# Patient Record
Sex: FEMALE | Race: BLACK OR AFRICAN AMERICAN | NOT HISPANIC OR LATINO | Employment: OTHER | URBAN - METROPOLITAN AREA
[De-identification: names, ages, dates, MRNs, and addresses within clinical notes are randomized per-mention and may not be internally consistent; named-entity substitution may affect disease eponyms.]

---

## 2020-08-11 ENCOUNTER — TELEPHONE (OUTPATIENT)
Dept: SPORTS MEDICINE | Facility: CLINIC | Age: 70
End: 2020-08-11

## 2020-08-11 NOTE — TELEPHONE ENCOUNTER
Left message with Patient's son. He said he would have her call me back today to schedule an appt.

## 2020-08-11 NOTE — TELEPHONE ENCOUNTER
----- Message from Bonnie Mcgrath LPN sent at 8/11/2020  3:06 PM CDT -----  Regarding: FW: Complex care of medial meniscus L knee referral. GGG  Contact: Patti (Dr. Gume Parson) @ [phone]  Spoke with Patti Judd/Dr. Parson/[facility] and she refaxed referral to me at [facility]. Am faxing the referral to your [phone] printer now. ]  Thank you,Bonnie  [phone]  ----- Message -----  From: Hanny Garner  Sent: 8/11/2020   2:33 PM CDT  To: [facility] Triage  Subject: appointment                                      Caller is returning a missed call from [facility] regarding scheduling patient an appt as soon as possible for dx: complex care of medial meniscus of the left knee. Caller says the referral was faxed over. Please call.

## 2020-08-17 ENCOUNTER — HOSPITAL ENCOUNTER (OUTPATIENT)
Dept: RADIOLOGY | Facility: HOSPITAL | Age: 70
Discharge: HOME | End: 2020-08-17
Attending: ORTHOPAEDIC SURGERY
Payer: MEDICARE

## 2020-08-17 ENCOUNTER — OFFICE VISIT (OUTPATIENT)
Dept: SPORTS MEDICINE | Facility: CLINIC | Age: 70
End: 2020-08-17
Payer: MEDICARE

## 2020-08-17 VITALS
DIASTOLIC BLOOD PRESSURE: 76 MMHG | SYSTOLIC BLOOD PRESSURE: 149 MMHG | BODY MASS INDEX: 28.32 KG/M2 | HEART RATE: 98 BPM | WEIGHT: 150 LBS | HEIGHT: 61 IN

## 2020-08-17 DIAGNOSIS — M25.562 LEFT KNEE PAIN, UNSPECIFIED CHRONICITY: ICD-10-CM

## 2020-08-17 DIAGNOSIS — M25.562 ACUTE PAIN OF LEFT KNEE: Primary | ICD-10-CM

## 2020-08-17 DIAGNOSIS — M17.12 LOCALIZED PRIMARY OSTEOARTHRITIS OF LEFT LOWER LEG: ICD-10-CM

## 2020-08-17 PROCEDURE — 99203 PR OFFICE/OUTPT VISIT, NEW, LEVL III, 30-44 MIN: ICD-10-PCS | Mod: S$PBB,,, | Performed by: ORTHOPAEDIC SURGERY

## 2020-08-17 PROCEDURE — 99999 PR PBB SHADOW E&M-EST. PATIENT-LVL IV: CPT | Mod: PBBFAC,,, | Performed by: ORTHOPAEDIC SURGERY

## 2020-08-17 PROCEDURE — 99999 PR PBB SHADOW E&M-EST. PATIENT-LVL IV: ICD-10-PCS | Mod: PBBFAC,,, | Performed by: ORTHOPAEDIC SURGERY

## 2020-08-17 PROCEDURE — 99214 OFFICE O/P EST MOD 30 MIN: CPT | Mod: PBBFAC | Performed by: ORTHOPAEDIC SURGERY

## 2020-08-17 PROCEDURE — 99203 OFFICE O/P NEW LOW 30 MIN: CPT | Mod: S$PBB,,, | Performed by: ORTHOPAEDIC SURGERY

## 2020-08-17 RX ORDER — PANTOPRAZOLE SODIUM 40 MG/1
40 TABLET, DELAYED RELEASE ORAL DAILY
COMMUNITY
Start: 2020-07-06 | End: 2023-03-16

## 2020-08-17 RX ORDER — CELECOXIB 200 MG/1
200 CAPSULE ORAL DAILY
COMMUNITY
Start: 2020-06-28

## 2020-08-17 RX ORDER — AMLODIPINE BESYLATE 10 MG/1
10 TABLET ORAL DAILY
COMMUNITY
Start: 2020-07-09

## 2020-08-17 RX ORDER — ROSUVASTATIN CALCIUM 20 MG/1
20 TABLET, COATED ORAL DAILY
COMMUNITY
Start: 2020-07-22

## 2020-08-17 NOTE — LETTER
August 17, 2020      Gume Parson MD  [address] [facility] [address]           [address] [address]  Phone: [phone]          Patient: Josey Gutiérrez   MR Number: [id number]   YOB: 1950   Date of Visit: 8/17/2020       Dear Dr. Gume Parson:    Thank you for referring Josey Gutiérrez to me for evaluation. Attached you will find relevant portions of my assessment and plan of care.    If you have questions, please do not hesitate to call me. I look forward to following Josey Gutiérrez along with you.    Sincerely,    Laureen Santana MD    Enclosure  CC:  No Recipients    If you would like to receive this communication electronically, please contact [email or web address] or [phone] to request more information on [employer] access.    For providers and/or their staff who would like to refer a patient to [facility], please contact us through our one-stop-shop provider referral line, [facility] , at [phone].    If you feel you have received this communication in error or would no longer like to receive these types of communications, please e-mail [email or web address]

## 2020-08-17 NOTE — PROGRESS NOTES
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       70 y.o. Female with a history of Left knee pain.     She reports a fall about 4 weeks ago onto the knee.   The pain has not changed since the injury.     No swelling was noticed.     + mechanical symptoms, she notes clicking.   no instability reported.     Pain with sleeping and sitting for prolonged periods of time.     She takes celebrex for OA but it has not helped with the new injury.   Had CSI to bilateral knee (Dr. Parson) about three weeks ago.   She notes 50% relief.     Is affecting ADLs.      SANE - 80%     Review of Systems   Constitution: Negative. Negative for chills, fever and night sweats.   HENT: Negative for congestion and headaches.    Eyes: Negative for blurred vision, left vision loss and right vision loss.   Cardiovascular: Negative for chest pain and syncope.   Respiratory: Negative for cough and shortness of breath.    Endocrine: Negative for polydipsia, polyphagia and polyuria.   Hematologic/Lymphatic: Negative for bleeding problem. Does not bruise/bleed easily.   Skin: Negative for dry skin, itching and rash.   Musculoskeletal: Negative for falls. Positive for knee pain and muscle weakness.   Gastrointestinal: Negative for abdominal pain and bowel incontinence.   Genitourinary: Negative for bladder incontinence and nocturia.   Neurological: Negative for disturbances in coordination, loss of balance and seizures.   Psychiatric/Behavioral: Negative for depression. The patient does not have insomnia.    Allergic/Immunologic: Negative for hives and persistent infections.     PAST MEDICAL HISTORY:   Past Medical History:   Diagnosis Date    Hypertension      PAST SURGICAL HISTORY: History reviewed. No pertinent surgical history.  FAMILY HISTORY: History reviewed. No pertinent family history.  SOCIAL HISTORY:   Social History     Socioeconomic History    Marital status:      Spouse name: Not on file    Number of children:  "Not on file    Years of education: Not on file    Highest education level: Not on file   Occupational History    Not on file   Social Needs    Financial resource strain: Not on file    Food insecurity     Worry: Not on file     Inability: Not on file    Transportation needs     Medical: Not on file     Non-medical: Not on file   Tobacco Use    Smoking status: Never Smoker    Smokeless tobacco: Never Used   Substance and Sexual Activity    Alcohol use: Not on file    Drug use: Not on file    Sexual activity: Not on file   Lifestyle    Physical activity     Days per week: Not on file     Minutes per session: Not on file    Stress: Not on file   Relationships    Social connections     Talks on phone: Not on file     Gets together: Not on file     Attends [employer] service: Not on file     Active member of club or organization: Not on file     Attends meetings of clubs or organizations: Not on file     Relationship status: Not on file   Other Topics Concern    Not on file   Social History Narrative    Not on file       MEDICATIONS:   Current Outpatient Medications:     amLODIPine (NORVASC) 10 MG tablet, Take 10 mg by mouth once daily., Disp: , Rfl:     celecoxib (CELEBREX) 200 MG capsule, Take 200 mg by mouth 2 (two) times daily., Disp: , Rfl:     pantoprazole (PROTONIX) 40 MG tablet, Take 40 mg by mouth once daily., Disp: , Rfl:     rosuvastatin (CRESTOR) 20 MG tablet, Take 20 mg by mouth once daily., Disp: , Rfl:   ALLERGIES: Review of patient's allergies indicates:  No Known Allergies    VITAL SIGNS: BP (!) 161/78   Pulse 92   Ht 5' 1" (1.549 m)   Wt 68 kg (150 lb)   BMI 28.34 kg/m²      PHYSICAL EXAMINATION  VITAL SIGNS: BP (!) 161/78   Pulse 92   Ht 5' 1" (1.549 m)   Wt 68 kg (150 lb)   BMI 28.34 kg/m²    General:  The patient is alert and oriented x 3.  Mood is pleasant.  Observation of ears, eyes and nose reveal no gross abnormalities.  HEENT: NCAT, sclera nonicteric  Lungs: " Respirations are equal and unlabored.    Left KNEE EXAMINATION     OBSERVATION / INSPECTION   Gait:   Nonantalgic   Alignment:  Neutral   Scars:   None   Muscle atrophy: Mild Bilaterally   Effusion:  None   Warmth:  None   Discoloration:   none     TENDERNESS / CREPITUS (T / C):          T / C      T / C   Patella   - / -   Lateral joint line   + / -    Peripatellar medial  -  Medial joint line    + / -    Peripatellar lateral -  Medial plica   - / -    Patellar tendon -   Popliteal fossa  - / -    Quad tendon   -   Gastrocnemius   -   Prepatellar Bursa - / -   Quadricep   -   Tibial tubercle  -  Thigh/hamstring  -   Pes anserine/HS -  Fibula    -   ITB   - / -  Tibia     -   Tib/fib joint  - / -  LCL    -     MFC   - / -   MCL: Proximal  -    LFC   - / -    Distal   -          ROM: (* = pain)  PASSIVE   ACTIVE    Left :   5 / 0 / 145   5 / 0 / 120    Right :    5 / 0 / 145   5 / 0 / 135    PATELLOFEMORAL EXAMINATION:  See above noted areas of tenderness.   Patella position    Subluxation / dislocation: Centered           Sup. / Inf;   Normal   Crepitus (PF):    Absent   Patellar Mobility:       Medial-lateral:   Normal    Superior-inferior:  Normal    Inferior tilt   Normal    Patellar tendon:  Normal   Lateral tilt:    Normal   J-sign:     None   Patellofemoral grind:   No pain       MENISCAL SIGNS:     Pain on terminal extension:  -  Pain on terminal flexion:  -  Danielas maneuver:  - for pain  Squat     NT    LIGAMENT EXAMINATION:  ACL / Lachman:  normal (-1 to 2mm)    PCL-Post.  drawer: normal 0 to 2mm  MCL- Valgus:  normal 0 to 2mm  LCL- Varus:  normal 0 to 2mm  Pivot shift: normal (Equal)   Dial Test: difference c/w other side   At 30° flexion: normal (< 5°)    At 90° flexion: normal (< 5°)   Reverse Pivot Shift:   normal (Equal)     STRENGTH: (* = with pain) PAINFUL SIDE   Quadricep   5/5   Hamstrin/5    EXTREMITY NEURO-VASCULAR EXAMINATION:   Sensation:  Grossly intact to light touch all  dermatomal regions.   Motor Function:  Fully intact motor function at hip, knee, foot and ankle    DTRs;  quadriceps and  achilles 2+.  No clonus and downgoing Babinski.    Vascular status:  DP and PT pulses 2+, brisk capillary refill, symmetric.     Other Findings:       X-rays:  including standing, weight bearing AP and flexion bilateral knees, lateral and merchant views ordered and images reviewed by me show:  No fracture, dislocation    MRI:     1. Complex tear posterior horn of medial meniscus.  2. Vertical radial tear posterior horn lateral meniscus.  3. Mild osteoarthritis.     ASSESSMENT:    Left Knee pain  Asymptomatic medial and lateral mensicus tear    PLAN:   Physical Therapy   Follow up as needed, patient was encouraged to follow up shoulder her pain not improve  All questions were answered, pt will contact us for questions or concerns in the interim.

## 2021-10-01 PROBLEM — H25.011 CORTICAL AGE-RELATED CATARACT, RIGHT EYE: Status: ACTIVE | Noted: 2021-10-01

## 2021-10-01 PROBLEM — H25.11 AGE-RELATED NUCLEAR CATARACT, RIGHT EYE: Status: ACTIVE | Noted: 2021-10-01

## 2021-10-04 ENCOUNTER — ANESTHESIA EVENT (OUTPATIENT)
Dept: SURGERY | Facility: HOSPITAL | Age: 71
End: 2021-10-04
Payer: MEDICARE

## 2021-10-04 ENCOUNTER — HOSPITAL ENCOUNTER (OUTPATIENT)
Dept: PREADMISSION TESTING | Facility: HOSPITAL | Age: 71
Discharge: HOME | End: 2021-10-04
Attending: OPHTHALMOLOGY
Payer: MEDICARE

## 2021-10-04 VITALS — WEIGHT: 120 LBS | BODY MASS INDEX: 22.66 KG/M2 | HEIGHT: 61 IN

## 2021-10-04 DIAGNOSIS — Z01.818 PRE-OP TESTING: ICD-10-CM

## 2021-10-04 LAB — SARS-COV-2 RNA RESP QL NAA+PROBE: NOT DETECTED

## 2021-10-04 PROCEDURE — U0002 COVID-19 LAB TEST NON-CDC: HCPCS | Performed by: OPHTHALMOLOGY

## 2021-10-04 RX ORDER — ASPIRIN 81 MG/1
TABLET ORAL
COMMUNITY

## 2021-10-04 RX ORDER — ALBUTEROL SULFATE 90 UG/1
AEROSOL, METERED RESPIRATORY (INHALATION)
COMMUNITY
Start: 2019-02-12

## 2021-10-04 RX ORDER — LEVOCETIRIZINE DIHYDROCHLORIDE 5 MG/1
TABLET, FILM COATED ORAL
COMMUNITY

## 2021-10-04 RX ORDER — MONTELUKAST SODIUM 10 MG/1
TABLET ORAL
COMMUNITY

## 2021-10-04 RX ORDER — FLUTICASONE PROPIONATE 50 MCG
SPRAY, SUSPENSION (ML) NASAL
COMMUNITY
Start: 2021-08-02

## 2021-10-05 ENCOUNTER — HOSPITAL ENCOUNTER (OUTPATIENT)
Facility: HOSPITAL | Age: 71
Discharge: HOME | End: 2021-10-05
Attending: OPHTHALMOLOGY | Admitting: OPHTHALMOLOGY
Payer: MEDICARE

## 2021-10-05 ENCOUNTER — ANESTHESIA (OUTPATIENT)
Dept: SURGERY | Facility: HOSPITAL | Age: 71
End: 2021-10-05
Payer: MEDICARE

## 2021-10-05 VITALS
DIASTOLIC BLOOD PRESSURE: 60 MMHG | OXYGEN SATURATION: 97 % | HEART RATE: 88 BPM | RESPIRATION RATE: 18 BRPM | SYSTOLIC BLOOD PRESSURE: 130 MMHG | TEMPERATURE: 98 F

## 2021-10-05 DIAGNOSIS — H25.11 AGE-RELATED NUCLEAR CATARACT, RIGHT EYE: ICD-10-CM

## 2021-10-05 PROBLEM — H25.011 CORTICAL AGE-RELATED CATARACT, RIGHT EYE: Status: RESOLVED | Noted: 2021-10-01 | Resolved: 2021-10-05

## 2021-10-05 PROCEDURE — 37000008 HC ANESTHESIA 1ST 15 MINUTES: Performed by: OPHTHALMOLOGY

## 2021-10-05 PROCEDURE — 37000009 HC ANESTHESIA EA ADD 15 MINS: Performed by: OPHTHALMOLOGY

## 2021-10-05 PROCEDURE — 25000242 PHARM REV CODE 250 ALT 637 W/ HCPCS: Performed by: ANESTHESIOLOGY

## 2021-10-05 PROCEDURE — 36000706: Performed by: OPHTHALMOLOGY

## 2021-10-05 PROCEDURE — V2632 POST CHMBR INTRAOCULAR LENS: HCPCS | Performed by: OPHTHALMOLOGY

## 2021-10-05 PROCEDURE — 63600175 PHARM REV CODE 636 W HCPCS: Performed by: NURSE ANESTHETIST, CERTIFIED REGISTERED

## 2021-10-05 PROCEDURE — 63600175 PHARM REV CODE 636 W HCPCS: Performed by: OPHTHALMOLOGY

## 2021-10-05 PROCEDURE — 71000015 HC POSTOP RECOV 1ST HR: Performed by: OPHTHALMOLOGY

## 2021-10-05 PROCEDURE — 36000707: Performed by: OPHTHALMOLOGY

## 2021-10-05 PROCEDURE — 25000003 PHARM REV CODE 250: Performed by: OPHTHALMOLOGY

## 2021-10-05 DEVICE — LENS 25.5: Type: IMPLANTABLE DEVICE | Site: EYE | Status: FUNCTIONAL

## 2021-10-05 RX ORDER — TROPICAMIDE 10 MG/ML
1 SOLUTION/ DROPS OPHTHALMIC
Status: COMPLETED | OUTPATIENT
Start: 2021-10-05 | End: 2021-10-05

## 2021-10-05 RX ORDER — LIDOCAINE HYDROCHLORIDE 10 MG/ML
INJECTION, SOLUTION EPIDURAL; INFILTRATION; INTRACAUDAL; PERINEURAL
Status: DISCONTINUED | OUTPATIENT
Start: 2021-10-05 | End: 2021-10-05 | Stop reason: HOSPADM

## 2021-10-05 RX ORDER — LIDOCAINE HYDROCHLORIDE 10 MG/ML
1 INJECTION, SOLUTION EPIDURAL; INFILTRATION; INTRACAUDAL; PERINEURAL ONCE
Status: DISCONTINUED | OUTPATIENT
Start: 2021-10-05 | End: 2021-10-05 | Stop reason: HOSPADM

## 2021-10-05 RX ORDER — PREDNISOLONE ACETATE 10 MG/ML
SUSPENSION/ DROPS OPHTHALMIC
Status: DISCONTINUED | OUTPATIENT
Start: 2021-10-05 | End: 2021-10-05 | Stop reason: HOSPADM

## 2021-10-05 RX ORDER — TETRACAINE HYDROCHLORIDE 5 MG/ML
1 SOLUTION OPHTHALMIC
Status: COMPLETED | OUTPATIENT
Start: 2021-10-05 | End: 2021-10-05

## 2021-10-05 RX ORDER — OFLOXACIN 3 MG/ML
SOLUTION/ DROPS OPHTHALMIC
Status: DISCONTINUED | OUTPATIENT
Start: 2021-10-05 | End: 2021-10-05 | Stop reason: HOSPADM

## 2021-10-05 RX ORDER — MOXIFLOXACIN 5 MG/ML
1 SOLUTION/ DROPS OPHTHALMIC
Status: COMPLETED | OUTPATIENT
Start: 2021-10-05 | End: 2021-10-05

## 2021-10-05 RX ORDER — PHENYLEPHRINE HYDROCHLORIDE 25 MG/ML
1 SOLUTION/ DROPS OPHTHALMIC
Status: COMPLETED | OUTPATIENT
Start: 2021-10-05 | End: 2021-10-05

## 2021-10-05 RX ORDER — TETRACAINE HYDROCHLORIDE 5 MG/ML
SOLUTION OPHTHALMIC
Status: DISCONTINUED | OUTPATIENT
Start: 2021-10-05 | End: 2021-10-05 | Stop reason: HOSPADM

## 2021-10-05 RX ORDER — BROMFENAC SODIUM 0.81 MG/ML
SOLUTION/ DROPS OPHTHALMIC
Status: DISCONTINUED | OUTPATIENT
Start: 2021-10-05 | End: 2021-10-05 | Stop reason: HOSPADM

## 2021-10-05 RX ORDER — LIDOCAINE HYDROCHLORIDE 10 MG/ML
0.5 INJECTION, SOLUTION EPIDURAL; INFILTRATION; INTRACAUDAL; PERINEURAL ONCE
Status: DISCONTINUED | OUTPATIENT
Start: 2021-10-05 | End: 2021-10-05 | Stop reason: HOSPADM

## 2021-10-05 RX ORDER — MIDAZOLAM HCL 2 MG/ML
4 SYRUP ORAL ONCE
Status: COMPLETED | OUTPATIENT
Start: 2021-10-05 | End: 2021-10-05

## 2021-10-05 RX ORDER — MIDAZOLAM HYDROCHLORIDE 1 MG/ML
INJECTION INTRAMUSCULAR; INTRAVENOUS
Status: DISCONTINUED | OUTPATIENT
Start: 2021-10-05 | End: 2021-10-05

## 2021-10-05 RX ADMIN — MOXIFLOXACIN 1 DROP: 5 SOLUTION/ DROPS OPHTHALMIC at 08:10

## 2021-10-05 RX ADMIN — TROPICAMIDE 1 DROP: 10 SOLUTION/ DROPS OPHTHALMIC at 08:10

## 2021-10-05 RX ADMIN — TETRACAINE HYDROCHLORIDE 1 DROP: 5 SOLUTION OPHTHALMIC at 08:10

## 2021-10-05 RX ADMIN — PHENYLEPHRINE HYDROCHLORIDE 1 DROP: 25 SOLUTION/ DROPS OPHTHALMIC at 08:10

## 2021-10-05 RX ADMIN — MIDAZOLAM 1 MG: 1 INJECTION INTRAMUSCULAR; INTRAVENOUS at 09:10

## 2021-10-05 RX ADMIN — MIDAZOLAM HYDROCHLORIDE 4 MG: 2 SYRUP ORAL at 09:10

## 2024-04-11 ENCOUNTER — TELEPHONE (OUTPATIENT)
Dept: NEUROSURGERY | Facility: CLINIC | Age: 74
End: 2024-04-11
Payer: MEDICARE

## 2024-04-11 NOTE — TELEPHONE ENCOUNTER
----- Message from Barbara Armijo RN sent at 4/10/2024  4:25 PM CDT -----  Regarding: FW: Appt Access  Contact: Matthew [phone]    ----- Message -----  From: Ernestine Bradley  Sent: 4/10/2024   2:36 PM CDT  To: [facility] Staff  Subject: Appt Access                                      Matthew /nurse [facility] calling to schedule appt for patient for memory loss and loss of vision. Pls call

## 2024-04-11 NOTE — TELEPHONE ENCOUNTER
Pt does not have a referral for neurosurgery.  Pts daughter says pt has memory loss and vision changes.  Informed pt that she would need to be worked up by PCP or neurology.  Pts daughter stated that a family member had a LP with Dr. Mir for NPH workup and would like to see if pt can have one as well.  Informed pts daughter that she would still need to have vision and memory loss symptoms reviewed by PCP or neurology before neurosurgery consult.   Pts daughter would like to schedule with neurology.  Msg sent to neurology pool. Russ.

## 2024-05-27 ENCOUNTER — TELEPHONE (OUTPATIENT)
Dept: NEUROLOGY | Facility: CLINIC | Age: 74
End: 2024-05-27
Payer: MEDICARE

## 2024-05-27 NOTE — TELEPHONE ENCOUNTER
----- Message from Denilson Mckeon sent at 5/27/2024 12:49 PM CDT -----  Regarding: FW: appt access  Contact: [phone]  Okay to schedule new patient with Dr. Shanks in August.  ----- Message -----  From: Bassem Shanks MD  Sent: 5/26/2024  12:20 PM CDT  To: Denilson Mckeon  Subject: RE: appt access                                  Ok to schedule  ----- Message -----  From: Denilson Mckeon  Sent: 5/23/2024   3:27 PM CDT  To: Bassem Shanks MD  Subject: FW: appt access                                  First time seeing this. There's a separate encounter stating that daughter called 3 times. No encounter/documentation anywhere in chart.    Referral in . Okay to see?  ----- Message -----  From: Elaine Wolff  Sent: 5/23/2024   3:16 PM CDT  To: [facility] Staff  Subject: appt access                                      Pt daughter Amilcar Parson is calling to speak with someone in provider office in regards to getting pt scheduled with the provider for memory loss Amilcar stated she was told someone would call her 4/11 and she hasn't heard from the office since then Amilcar  is asking for a return call please call her at  [phone]

## 2024-05-27 NOTE — TELEPHONE ENCOUNTER
Attempted to call pt to schedule as a NP in August per providers request, no answer, lvm, awaiting call back

## 2024-05-28 ENCOUNTER — TELEPHONE (OUTPATIENT)
Dept: NEUROLOGY | Facility: CLINIC | Age: 74
End: 2024-05-28
Payer: MEDICARE

## 2024-06-25 NOTE — TELEPHONE ENCOUNTER
Spoke with patient/spouse to coordinate scheduling appointments with [facility].    Educated patient/spouse about clinic operations and informed that patient will see a NP for initial/pre-visit then see MD afterwards.    However, patient's last login to patient portal was back in 2022. Has not log in since and is not tech efficient.    Patient is scheduled:  In-Person with Dr. Shanks on 7/31/2024

## (undated) DEVICE — ELECTRODE FOAM 535 TEARDROP

## (undated) DEVICE — WATER IRR STRL USP PLAS 250ML

## (undated) DEVICE — CANNULA ADULT NASAL 7FT

## (undated) DEVICE — GOWN SURGICAL BRTHBL XL XLNG

## (undated) DEVICE — GLOVE BIOGEL ECLIPSE SZ 6.5

## (undated) DEVICE — GLOVE BIOGEL ECLIPSE SZ 7.5

## (undated) DEVICE — SHEILD EYE PROTCT BLUE FLEX

## (undated) DEVICE — UNDERGLOVES BIOGEL PI SZ 7 LF

## (undated) DEVICE — UNDERGLOVE BIOGEL PI SZ 6.5 LF